# Patient Record
Sex: MALE | Race: WHITE | Employment: STUDENT | URBAN - NONMETROPOLITAN AREA
[De-identification: names, ages, dates, MRNs, and addresses within clinical notes are randomized per-mention and may not be internally consistent; named-entity substitution may affect disease eponyms.]

---

## 2022-05-01 ENCOUNTER — HOSPITAL ENCOUNTER (INPATIENT)
Age: 22
LOS: 3 days | Discharge: HOME | DRG: 885 | End: 2022-05-04
Attending: PSYCHIATRY & NEUROLOGY | Admitting: PSYCHIATRY & NEUROLOGY
Payer: COMMERCIAL

## 2022-05-01 DIAGNOSIS — R45.89 SUICIDAL BEHAVIOR WITHOUT ATTEMPTED SELF-INJURY: ICD-10-CM

## 2022-05-01 DIAGNOSIS — F32.A DEPRESSION WITH SUICIDAL IDEATION: Primary | ICD-10-CM

## 2022-05-01 DIAGNOSIS — R45.851 DEPRESSION WITH SUICIDAL IDEATION: Primary | ICD-10-CM

## 2022-05-01 DIAGNOSIS — F10.920 ACUTE ALCOHOLIC INTOXICATION WITHOUT COMPLICATION (HCC): ICD-10-CM

## 2022-05-01 PROBLEM — F33.9 MDD (MAJOR DEPRESSIVE DISORDER), RECURRENT EPISODE (HCC): Status: ACTIVE | Noted: 2022-05-01

## 2022-05-01 LAB
ACETAMINOPHEN LEVEL: < 5 UG/ML (ref 0–20)
ALBUMIN SERPL-MCNC: 5.7 G/DL (ref 3.5–5.1)
ALP BLD-CCNC: 125 U/L (ref 38–126)
ALT SERPL-CCNC: 28 U/L (ref 11–66)
AMPHETAMINE+METHAMPHETAMINE URINE SCREEN: NEGATIVE
ANION GAP SERPL CALCULATED.3IONS-SCNC: 13 MEQ/L (ref 8–16)
AST SERPL-CCNC: 32 U/L (ref 5–40)
BACTERIA: ABNORMAL /HPF
BARBITURATE QUANTITATIVE URINE: NEGATIVE
BASOPHILS # BLD: 0.8 %
BASOPHILS ABSOLUTE: 0.1 THOU/MM3 (ref 0–0.1)
BENZODIAZEPINE QUANTITATIVE URINE: NEGATIVE
BILIRUB SERPL-MCNC: 0.2 MG/DL (ref 0.3–1.2)
BILIRUBIN DIRECT: < 0.2 MG/DL (ref 0–0.3)
BILIRUBIN URINE: NEGATIVE
BLOOD, URINE: NEGATIVE
BUN BLDV-MCNC: 14 MG/DL (ref 7–22)
CALCIUM SERPL-MCNC: 9.7 MG/DL (ref 8.5–10.5)
CANNABINOID QUANTITATIVE URINE: NEGATIVE
CASTS 2: ABNORMAL /LPF
CASTS UA: ABNORMAL /LPF
CHARACTER, URINE: CLEAR
CHLORIDE BLD-SCNC: 105 MEQ/L (ref 98–111)
CO2: 23 MEQ/L (ref 23–33)
COCAINE METABOLITE QUANTITATIVE URINE: NEGATIVE
COLOR: YELLOW
CREAT SERPL-MCNC: 0.9 MG/DL (ref 0.4–1.2)
CRYSTALS, UA: ABNORMAL
EOSINOPHIL # BLD: 1.7 %
EOSINOPHILS ABSOLUTE: 0.2 THOU/MM3 (ref 0–0.4)
EPITHELIAL CELLS, UA: ABNORMAL /HPF
ERYTHROCYTE [DISTWIDTH] IN BLOOD BY AUTOMATED COUNT: 12.2 % (ref 11.5–14.5)
ERYTHROCYTE [DISTWIDTH] IN BLOOD BY AUTOMATED COUNT: 39.7 FL (ref 35–45)
ETHYL ALCOHOL, SERUM: 0.05 %
ETHYL ALCOHOL, SERUM: 0.23 %
GFR SERPL CREATININE-BSD FRML MDRD: > 90 ML/MIN/1.73M2
GLUCOSE BLD-MCNC: 120 MG/DL (ref 70–108)
GLUCOSE URINE: NEGATIVE MG/DL
HCT VFR BLD CALC: 47.5 % (ref 42–52)
HEMOGLOBIN: 16.1 GM/DL (ref 14–18)
IMMATURE GRANS (ABS): 0.04 THOU/MM3 (ref 0–0.07)
IMMATURE GRANULOCYTES: 0.4 %
KETONES, URINE: NEGATIVE
LEUKOCYTE ESTERASE, URINE: NEGATIVE
LYMPHOCYTES # BLD: 12 %
LYMPHOCYTES ABSOLUTE: 1.1 THOU/MM3 (ref 1–4.8)
MCH RBC QN AUTO: 30 PG (ref 26–33)
MCHC RBC AUTO-ENTMCNC: 33.9 GM/DL (ref 32.2–35.5)
MCV RBC AUTO: 88.5 FL (ref 80–94)
MISCELLANEOUS 2: ABNORMAL
MONOCYTES # BLD: 8.6 %
MONOCYTES ABSOLUTE: 0.8 THOU/MM3 (ref 0.4–1.3)
NITRITE, URINE: NEGATIVE
NUCLEATED RED BLOOD CELLS: 0 /100 WBC
OPIATES, URINE: NEGATIVE
OSMOLALITY CALCULATION: 282.9 MOSMOL/KG (ref 275–300)
OXYCODONE: NEGATIVE
PH UA: 5.5 (ref 5–9)
PHENCYCLIDINE QUANTITATIVE URINE: NEGATIVE
PLATELET # BLD: 272 THOU/MM3 (ref 130–400)
PMV BLD AUTO: 9.9 FL (ref 9.4–12.4)
POTASSIUM SERPL-SCNC: 3.9 MEQ/L (ref 3.5–5.2)
PROTEIN UA: ABNORMAL
RBC # BLD: 5.37 MILL/MM3 (ref 4.7–6.1)
RBC URINE: ABNORMAL /HPF
RENAL EPITHELIAL, UA: ABNORMAL
SALICYLATE, SERUM: < 0.3 MG/DL (ref 2–10)
SEG NEUTROPHILS: 76.5 %
SEGMENTED NEUTROPHILS ABSOLUTE COUNT: 7 THOU/MM3 (ref 1.8–7.7)
SODIUM BLD-SCNC: 141 MEQ/L (ref 135–145)
SPECIFIC GRAVITY, URINE: 1.02 (ref 1–1.03)
TOTAL PROTEIN: 8.9 G/DL (ref 6.1–8)
TSH SERPL DL<=0.05 MIU/L-ACNC: 3 UIU/ML (ref 0.4–4.2)
UROBILINOGEN, URINE: 0.2 EU/DL (ref 0–1)
WBC # BLD: 9.1 THOU/MM3 (ref 4.8–10.8)
WBC UA: ABNORMAL /HPF
YEAST: ABNORMAL

## 2022-05-01 PROCEDURE — 80307 DRUG TEST PRSMV CHEM ANLYZR: CPT

## 2022-05-01 PROCEDURE — 82077 ASSAY SPEC XCP UR&BREATH IA: CPT

## 2022-05-01 PROCEDURE — 80053 COMPREHEN METABOLIC PANEL: CPT

## 2022-05-01 PROCEDURE — 80179 DRUG ASSAY SALICYLATE: CPT

## 2022-05-01 PROCEDURE — 82248 BILIRUBIN DIRECT: CPT

## 2022-05-01 PROCEDURE — 36415 COLL VENOUS BLD VENIPUNCTURE: CPT

## 2022-05-01 PROCEDURE — 99285 EMERGENCY DEPT VISIT HI MDM: CPT

## 2022-05-01 PROCEDURE — 1240000000 HC EMOTIONAL WELLNESS R&B

## 2022-05-01 PROCEDURE — 85025 COMPLETE CBC W/AUTO DIFF WBC: CPT

## 2022-05-01 PROCEDURE — 81001 URINALYSIS AUTO W/SCOPE: CPT

## 2022-05-01 PROCEDURE — 80143 DRUG ASSAY ACETAMINOPHEN: CPT

## 2022-05-01 PROCEDURE — 84443 ASSAY THYROID STIM HORMONE: CPT

## 2022-05-01 RX ORDER — HYDROXYZINE HYDROCHLORIDE 25 MG/1
50 TABLET, FILM COATED ORAL 3 TIMES DAILY PRN
Status: DISCONTINUED | OUTPATIENT
Start: 2022-05-01 | End: 2022-05-04 | Stop reason: HOSPADM

## 2022-05-01 RX ORDER — IBUPROFEN 200 MG
400 TABLET ORAL EVERY 6 HOURS PRN
Status: DISCONTINUED | OUTPATIENT
Start: 2022-05-01 | End: 2022-05-04 | Stop reason: HOSPADM

## 2022-05-01 RX ORDER — NICOTINE 21 MG/24HR
1 PATCH, TRANSDERMAL 24 HOURS TRANSDERMAL DAILY
Status: DISCONTINUED | OUTPATIENT
Start: 2022-05-01 | End: 2022-05-04 | Stop reason: HOSPADM

## 2022-05-01 RX ORDER — TRAZODONE HYDROCHLORIDE 50 MG/1
50 TABLET ORAL NIGHTLY PRN
Status: DISCONTINUED | OUTPATIENT
Start: 2022-05-01 | End: 2022-05-04 | Stop reason: HOSPADM

## 2022-05-01 RX ORDER — ACETAMINOPHEN 325 MG/1
650 TABLET ORAL EVERY 4 HOURS PRN
Status: DISCONTINUED | OUTPATIENT
Start: 2022-05-01 | End: 2022-05-04 | Stop reason: HOSPADM

## 2022-05-01 RX ORDER — MAGNESIUM HYDROXIDE/ALUMINUM HYDROXICE/SIMETHICONE 120; 1200; 1200 MG/30ML; MG/30ML; MG/30ML
30 SUSPENSION ORAL EVERY 6 HOURS PRN
Status: DISCONTINUED | OUTPATIENT
Start: 2022-05-01 | End: 2022-05-04 | Stop reason: HOSPADM

## 2022-05-01 ASSESSMENT — ENCOUNTER SYMPTOMS
SINUS PRESSURE: 0
EYE PAIN: 0
SHORTNESS OF BREATH: 0
DIARRHEA: 0
COUGH: 0
BLOOD IN STOOL: 0
CONSTIPATION: 0
RHINORRHEA: 0
NAUSEA: 0
ABDOMINAL PAIN: 0
VOMITING: 0
WHEEZING: 0

## 2022-05-01 ASSESSMENT — SLEEP AND FATIGUE QUESTIONNAIRES
DO YOU USE A SLEEP AID: NO
AVERAGE NUMBER OF SLEEP HOURS: -1
DO YOU HAVE DIFFICULTY SLEEPING: NO
DO YOU HAVE DIFFICULTY SLEEPING: NO

## 2022-05-01 ASSESSMENT — LIFESTYLE VARIABLES
HOW OFTEN DO YOU HAVE A DRINK CONTAINING ALCOHOL: MONTHLY OR LESS
HOW MANY STANDARD DRINKS CONTAINING ALCOHOL DO YOU HAVE ON A TYPICAL DAY: 7 TO 9
HOW OFTEN DO YOU HAVE A DRINK CONTAINING ALCOHOL: MONTHLY OR LESS
HOW MANY STANDARD DRINKS CONTAINING ALCOHOL DO YOU HAVE ON A TYPICAL DAY: 7 TO 9

## 2022-05-01 ASSESSMENT — PATIENT HEALTH QUESTIONNAIRE - PHQ9: SUM OF ALL RESPONSES TO PHQ QUESTIONS 1-9: 13

## 2022-05-01 ASSESSMENT — PAIN - FUNCTIONAL ASSESSMENT
PAIN_FUNCTIONAL_ASSESSMENT: NONE - DENIES PAIN

## 2022-05-01 NOTE — PROGRESS NOTES
Behavioral Health   Admission Note     Admission Type:   Admission Type: Involuntary    Reason for admission:  Reason for Admission: Patient was intoxicated an became suicidal    PATIENT STRENGTHS:       Patient Strengths and Limitations:       Addictive Behavior:   Addictive Behavior  In the Past 3 Months, Have You Felt or Has Someone Told You That You Have a Problem With  : Sex/pornography    Medical Problems:   History reviewed. No pertinent past medical history. Status EXAM:  Mental Status and Behavioral Exam  Normal: No  Level of Assistance: Independent/Self  Facial Expression: Avoids Gaze,Flat,Sad  Affect: Blunt,Constricted  Level of Consciousness: Alert  Frequency of Checks: 4 times per hour, close  Mood:Normal: No  Mood: Depressed,Anxious,Worthless, low self-esteem,Sad  Motor Activity:Normal: Yes  Motor Activity:  (normal)  Eye Contact: Good  Observed Behavior: Cooperative,Friendly  Sexual Misconduct History: Current - no  Preception: [place] to person,[place] to time,[place] to place,[place] to situation  Attention:Normal: Yes  Thought Processes: Circumstantial  Thought Content:Normal: Yes  Depression Symptoms: Feelings of hopelessess,Feelings of worthlessness,Isolative,Loss of interest,Feelings of helplessness,Crying  Anxiety Symptoms: Generalized,Obsessions  Jenny Symptoms: No problems reported or observed. Hallucinations: None  Delusions: No  Memory:Normal: Yes  Memory:  (denies)  Insight and Judgment: No  Insight and Judgment: Poor judgment,Poor insight    Pt admitted with followings belongings:  Dental Appliances: None  Vision - Corrective Lenses: Eyeglasses  Hearing Aid: None  Jewelry: None  Body Piercings Removed: No  Clothing: Pants,Shirt,Socks,Undergarments (belt)  Other Valuables: Wallet,Keys,Lighter/Matches     Admission order obtained Yes  Belongings sent to safe locked locker with. Valuables placed in safe in security envelope, number. Patient's has no home medications.   Patient oriented to surroundings and program expectations and copy of patient rights given. Received admission packet:  Yes  Consents reviewed, signed Yes. \"An Important Message from [employer] About Your Rights\" form reviewed, signed: yes . Patient verbalize understanding: Yes. Patient education on precautions: YES: yes          Patient screened positive for suicide risk on CSSR-S (\"yes\" to question #4, 5, OR 6)  yes. Physician notified of risk score. Orders received N/A .  2 person skin assessment completed upon admission refused. Explained patients right to have family, representative or physician notified of their admission. Patient has Declined for physician to be notified. Patient has Declined for family/representative to be notified. Provided pt with [employer] handout entitled \"Quitting Smoking. \"  Reviewed handout with pt addressing dangers of smoking, developing coping skills, and providing basic information about quitting. Pt response to counseling:  declined    Admission summary: Patient got admitted 5-1-2022 at 65. 25year old male who was drunk and has thought of suicide. Patient was [facility] an has been educated.            Anne Robertson LPN
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
## 2022-05-01 NOTE — PROGRESS NOTES
Checked in on patient. Patient sitting on floor in safe room common area, appears to be sleeping. [place] police continues to monitor.

## 2022-05-01 NOTE — ED NOTES
Patient resting in safe room 26 for 1:1 observation. No signs of distress at this time. Will continue to monitor.       Tanesha Oviedo RN  05/01/22 7034

## 2022-05-01 NOTE — PROGRESS NOTES
This RN has reviewed and agrees with Audi Pastrana LPN's data collection and has collaborated with this LPN regarding the patient's care plan.

## 2022-05-01 NOTE — ED NOTES
ED nurse-to-nurse bedside report    Chief Complaint   Patient presents with    Suicidal      LOC: alert and orientated to name, place, date  Vital signs   Vitals:    05/01/22 0551   BP: 126/63   Pulse: 108   Resp: 20   Temp: 98.6 °F (37 °C)   TempSrc: Oral   SpO2: 100%   Weight: 180 lb (81.6 kg)   Height: 6' 3\" (1.905 m)      Pain:    Pain Interventions: None  Pain Goal: No pain stated   Oxygen: No    Current needs required Constant observation    Telemetry: No  LDAs:    Continuous Infusions:   Mobility: Independent  Veneta Fall Risk Score: No flowsheet data found.   Fall Interventions: Side rails up, hourly rounding   Report given to: Andrew Ingram, [place]  05/01/22 4559

## 2022-05-01 NOTE — ED NOTES
Upon first contact with patient this RN receives bedside shift report Alyx Bae RN. Patient in safe room 26 for 1:1 observation. No signs of distress. Will continue to monitor.       Juanita Oviedo RN  05/01/22 9654

## 2022-05-01 NOTE — ED NOTES
Patient resting in safe room 26 for 1:1 observation. No signs of distress at this time. Will continue to monitor.       Darryl Oviedo RN  05/01/22 8020

## 2022-05-01 NOTE — ED NOTES
Pt resting in bed with constant observation in progress, no distress noted as pt breaths easy and unlabored.        [facility]  05/01/22 9046

## 2022-05-01 NOTE — ED NOTES
ED to inpatient nurses report    Chief Complaint   Patient presents with    Suicidal      Present to ED from home  LOC: alert and orientated to name, place, date  Vital signs   Vitals:    05/01/22 0551 05/01/22 1045 05/01/22 1526   BP: 126/63     Pulse: 108 102 90   Resp: 20 18 17   Temp: 98.6 °F (37 °C)     TempSrc: Oral     SpO2: 100% 100% 100%   Weight: 180 lb (81.6 kg)     Height: 6' 3\" (1.905 m)        Oxygen Baseline room air WNL    Current needs required room air WNL Bipap/Cpap No  LDAs:    Mobility: Independent  Pending ED orders: none at this time  Present condition: patient resting in safe room 26      Electronically signed by Neena Webb RN on 5/1/2022 at 4:20 PM     Filomena Oviedo RN  05/01/22 6548

## 2022-05-01 NOTE — PROGRESS NOTES
Patient continues resting on safe room common area floor. Patient denies any needs. Updated on plan for BAL redraw at 1400.

## 2022-05-01 NOTE — ED NOTES
Patient resting in safe room 26 for 1:1 observation. No signs of distress at this time. Will continue to monitor.         Tanesha Oviedo RN  05/01/22 7927
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
## 2022-05-01 NOTE — ED NOTES
Patient resting in safe room 26 for 1:1 observation. No signs of distress at this time. Will continue to monitor.              Pancho Oviedo RN  05/01/22 8643

## 2022-05-01 NOTE — PROGRESS NOTES
BAL 24 Hour Re-Assess:   Status & Exam & Behavior Support Service Tabs      Present Suicidal Behavior:      Verbal: Denies    Plan: Denies    Current Suicide Risk: Low, Moderate or High: Low      Present Homicidal Behavior:    Verbal: Denies    Plan: Denies      Psychosis:    Hallucinations: Denies    Delusions: Denies      Clinical Re-Assessment Summary including Current Mental State of Patient:     Patient is a 25year old male who presents to the ED on [facility] via [place] [employer]. Patient BAL was . 23 upon arrival. Patient currently denies any suicidal/homicidal ideation. Patient reports when he presented to the ED it was due to suicidal thoughts which he had made statements about. Patient denies any significant stressors recently. Patient denies any history of suicidal thoughts or attempts, however this is contradicting of earlier reports to third shift SW and medical provider. Hallucinations denied. No delusions noted. Updated Level of Care Disposition:     Consulted with Dr. Gina Ny. Patient to be admitted to 4E. Nurse updated on POC. Patient updated on POC. Report given to Patti Barr on 4E.
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
## 2022-05-02 PROBLEM — F33.2 SEVERE EPISODE OF RECURRENT MAJOR DEPRESSIVE DISORDER, WITHOUT PSYCHOTIC FEATURES (HCC): Status: ACTIVE | Noted: 2022-05-01

## 2022-05-02 PROBLEM — F32.A DEPRESSION WITH SUICIDAL IDEATION: Status: ACTIVE | Noted: 2022-05-01

## 2022-05-02 PROBLEM — R45.851 DEPRESSION WITH SUICIDAL IDEATION: Status: ACTIVE | Noted: 2022-05-01

## 2022-05-02 PROBLEM — F32.2 MAJOR DEPRESSIVE DISORDER, SINGLE EPISODE, SEVERE WITHOUT PSYCHOTIC FEATURES (HCC): Status: ACTIVE | Noted: 2022-05-01

## 2022-05-02 PROCEDURE — 90792 PSYCH DIAG EVAL W/MED SRVCS: CPT | Performed by: PSYCHIATRY & NEUROLOGY

## 2022-05-02 PROCEDURE — APPSS30 APP SPLIT SHARED TIME 16-30 MINUTES: Performed by: PHYSICIAN ASSISTANT

## 2022-05-02 PROCEDURE — 6370000000 HC RX 637 (ALT 250 FOR IP): Performed by: PHYSICIAN ASSISTANT

## 2022-05-02 PROCEDURE — 6370000000 HC RX 637 (ALT 250 FOR IP): Performed by: PSYCHIATRY & NEUROLOGY

## 2022-05-02 PROCEDURE — 1240000000 HC EMOTIONAL WELLNESS R&B

## 2022-05-02 RX ORDER — SERTRALINE HYDROCHLORIDE 25 MG/1
25 TABLET, FILM COATED ORAL DAILY
Status: DISCONTINUED | OUTPATIENT
Start: 2022-05-02 | End: 2022-05-03

## 2022-05-02 RX ADMIN — IBUPROFEN 400 MG: 200 TABLET, FILM COATED ORAL at 14:47

## 2022-05-02 RX ADMIN — SERTRALINE 25 MG: 25 TABLET, FILM COATED ORAL at 14:47

## 2022-05-02 ASSESSMENT — PAIN SCALES - GENERAL: PAINLEVEL_OUTOF10: 5

## 2022-05-02 ASSESSMENT — PAIN DESCRIPTION - LOCATION: LOCATION: HEAD

## 2022-05-02 NOTE — GROUP NOTE
Group Therapy Note    Date: 5/2/2022    Group Start Time: 1100  Group End Time: 36  Group Topic: Healthy Living/Wellness    [facility] 4E    Noé Blackman LPN        Group Therapy Note    Attendees: 2        Notes:  attended    Status After Intervention:  Improved    Participation Level:  Active Listener and Interactive    Participation Quality: Appropriate, Attentive and Sharing      Speech:  normal      Thought Process/Content: Logical      Affective Functioning: Flat      Level of consciousness:  Alert, Oriented x4 and Attentive      Response to Learning: Able to verbalize current knowledge/experience, Able to verbalize/acknowledge new learning and Able to retain information      Endings: None Reported    Modes of Intervention: Education, Support and Socialization      Discipline Responsible: Licensed Practical Nurse, students      Signature:  Noé Blackman LPN

## 2022-05-02 NOTE — PROGRESS NOTES
Behavioral Services  Medicare Certification Upon Admission    I certify that this patient's inpatient psychiatric hospital admission is medically necessary for:    [x] (1) Treatment which could reasonably be expected to improve this patient's condition,       [x] (2) Or for diagnostic study;     AND     [x](2) The inpatient psychiatric services are provided while the individual is under the care of a physician and are included in the individualized plan of care.     Estimated length of stay/service 3-5 days    Plan for post-hospital care hc    Electronically signed by Chyna Jones MD on 5/2/2022 at 9:40 AM

## 2022-05-02 NOTE — PROGRESS NOTES
[facility]  Initial Interdisciplinary Treatment Plan NOTE    Review Date & Time: 05/02/22     Patient was in treatment team.  See Multidisciplinary Treatment Team sheet for participants. Admission Type:   Admission Type: Involuntary    Reason for admission:  Reason for Admission: Patient was intoxicated an became suicidal      Estimated Length of Stay Update:  05/03/22  Estimated Discharge Date Update: 3-5 days    PATIENT STRENGTHS:  Patient Strengths    Patient Strengths and Limitations:   Addictive Behavior:Addictive Behavior  In the Past 3 Months, Have You Felt or Has Someone Told You That You Have a Problem With  : Sex/pornography  Medical Problems:History reviewed. No pertinent past medical history. EDUCATION:   Learner Progress Toward Treatment Goals: Reviewed results and recommendations of this team, Reviewed group plan and strategies, Reviewed signs, symptoms and risk of self harm and violent behavior and Reviewed goals and plan of care    Method: Individual    Outcome: Verbalized understanding and Demonstrated Understanding    PATIENT GOALS: See below    PLAN/TREATMENT RECOMMENDATIONS UPDATE:   1. What is the most important thing we can help you with while you are here? Adjust my expectations of myself. 2. Who is your support system? Family, friends  3. Do you have follow-up providers? None. Will be referred to [facility]  4. Do you have the ability to pay for your medications? No  5. Where will you be residing when you leave the hospital? With parents  6. Will need a return to work slip or FMLA paper completion?  Yes      GOALS UPDATE:   Time frame for Short-Term Goals: Daily    NATALIIA Farr

## 2022-05-02 NOTE — H&P
Department of Psychiatry  Psychiatric Assessment   Reason for Admission to Psychiatric Unit:  Threat to self requiring 24 hour professional observation  Concerns about patient's safety in the community    CHIEF COMPLAINT:  Suicidal ideation     HISTORY OF PRESENT ILLNESS:      Alondra Ward is a 25 y.o. male with a history of depression  who presented to the emergency department on [address] by Marlys TOLBERT for suicidal ideation. Per [address], \"Sven made multiple threats to kill himself and tried fighting officers. He also made threats for me to \"kill\" him.      Per the [facility] AN AFFILIATE OF [facility] note: \"Pt states he \"wants to end it all and be done. \" He started having these thoughts tonight but cannot remember why. \"Sushila had a lot to drink. \" He reports feeling like \"the sum of the Earth and there's no point in being around like that. \" Pt is unaware who called the police and does not recall which friend he made suicidal statements to. Pt states he attempted suicide in May 2019 \"I loaded a shell in the shogun and just looked at it. I should have done it. \" Reports recent stressors of college and family. \"      BAL was 0.23 upon arrival to the ED     Tyrus Schwab reports he was drinking with his friends yesterday. He states he was telling his friends that he was suicidal and then his friends called the . He confirms that he did ask the  to kill him. He reports he has been having suicidal thoughts coming and going for the last few years. He says lately, the thoughts been getting progressively worse. He denies having a specific plan or intent prior to yesterday. He reports he has been dealing with depression for a few years since his grandfather  in 2019. He says he then later dropped out of college. He has never gotten formal help for his depression. He states he always thought he could deal with it on his own. He mentions that he feels like a burden on his family and at work.   He says at this time, he does not have enough money saved up to live on his own so he has to live with his parents. He feels like a burden at work because every mistake he makes he feels a whole weight come down on him. He endorses having significant issues with self-esteem since a young age. He mentions that he puts everyone before himself. He denies feeling down and sad for more days than not. He reports his sleep has been fine at home. Appetite has been good. Energy and motivation have been good. He enjoys working every day. Denies anhedonia. He reports he enjoys playing the [employer] and video games. He has been feeling worthless, hopeless and helpless. Jordon Ramey continues to feel depressed today. He reports he is ashamed about the events that led to his admission and that it had to happen that way. He endorses fleeting suicidal thoughts but denies any specific plan or intent. He denies any active suicidal thoughts during the interview and is able to contract for safety in the unit. He denies any hallucinations. No evidence of delusions or overt psychosis on examination. PSYCHIATRIC HISTORY:      · Outpatient psychiatric provider:  No one currently. Has never seen a therapist or psychiatrist in the past  · Suicide attempts: Patient denies any; per the medical record, the patient had an aborted suicide attempt by shooting himself in May 2019  · Inpatient psychiatric admissions: Denies any  · Home Medication Compliance: Not prescribed psychiatric medication    Past psychiatric medications includes:     None  Adverse reactions from psychotropic medications:    No      Past Medical History:    History reviewed. No pertinent past medical history. Past Surgical History:    History reviewed. No pertinent surgical history. Medications Prior to Admission:   No medications prior to admission. Allergies:  Patient has no known allergies.     Social History:   BORN & RAISED IN [facility]  · RESIDENCE: Currently lives in [place] with his parents and younger brother  · LEVEL OF EDUCATION:   Completed 2 years of college studying chemical engineering at the [address] [place] [address] Rd. Did not obtain his degree  · MARITAL STATUS: Single  · CHILDREN: None  · OCCUPATION: Works as a hog stein at [employer]  · PATIENT ASSETS: Seeking help, family supportive, employment    DRUG USE HISTORY  Social History     Tobacco Use   Smoking Status Current Some Day Smoker    Types: Cigars   Smokeless Tobacco Never Used   Tobacco Comment    once a weekend     Social History     Substance and Sexual Activity   Alcohol Use Yes    Comment:  Tonnie Kavya half bottle once a month     Social History     Substance and Sexual Activity   Drug Use Never     Patient reports he drinks half of a bottle of Keshawn once a month. Denies any history of withdrawal from alcohol. Denies any illicit drug use    LEGAL HISTORY:   HISTORY OF INCARCERATION: no    Family Psychiatric and Medical History:   Denies any family psychiatric history  Denies suicides in family  Denies substance use in family        Problem Relation Age of Onset    Hypertension Mother     Heart Disease Paternal Grandfather     Heart Disease Other     Hypertension Paternal Grandmother     Hypertension Other     Diabetes Other     Cancer Other         ?  Kidney Disease Other          Lifetime Psychiatric Review of Systems      ·    Obsessions and Compulsions: denies  ·    Jenny or Hypomania: denies  ·    Hallucinations: denies  ·    Panic Attacks:  denies   ·    Delusions:  denies  ·    Phobias: denies       Medical Review of Systems:     Constitutional: Negative for appetite change, diaphoresis, fatigue and fever. HENT: Negative for congestion, sore throat and tinnitus. Eyes: Negative for visual disturbance. Respiratory: Negative for cough, shortness of breath and wheezing. Cardiovascular: Negative for chest pain and leg swelling. Gastrointestinal: Negative for nausea, vomiting, diarrhea.  Negative for abdominal pain. Genitourinary: Negative for frequency. Musculoskeletal: Negative for arthralgias, myalgias and neck stiffness. Skin: Negative for puritis. Neurological: Negative for dizziness, weakness and headaches. All other systems reviewed and are negative. PHYSICAL EXAM:  Vitals:  BP (!) 106/41   Pulse 98   Temp 98 °F (36.7 °C) (Oral)   Resp 16   Ht 6' 3\" (1.905 m)   Wt 180 lb (81.6 kg)   SpO2 100%   BMI 22.50 kg/m²     Pain Level: Denies any pain      Physical Exam:    Constitutional: Well developed, well nourished, no acute distress  Eyes: Pupils round and reactive to light bilaterally  Neck:  Supple, no thyromegaly. Cardiovascular:  Normal rate and rhythm, normal S1 and S2. No murmur or gallop on auscultation. Radial pulses 2+ and brisk bilaterally  Lungs: Clear to auscultation bilaterally without wheezing or rales. Musculoskeletal:  Full range of motion in all four extremities. Neurologic:  Cranial nerves II through XII are grossly intact. Normal gait and station.        Mental Status Examination:    Level of consciousness:  alert and awake  Appearance:  well-appearing, hospital attire, in chair, good grooming and good hygiene  Behavior/Motor:  no abnormalities noted  Attitude toward examiner:  cooperative, attentive and good eye contact  Speech:  spontaneous, normal rate and normal volume  Mood: Depressed   Affect:  blunted  Thought processes:  linear, goal directed and coherent  Thought content:  Denies homicidal ideation  Suicidal Ideation: Fleeting without current plan or intent  Delusions:  no evidence of delusions  Perceptual Disturbance:  denies any perceptual disturbance  Cognition: Patient is oriented to person, place, time and situation  Concentration: clinically adequate  Memory: intact  Insight & Judgement: fair         DSM-5 DIAGNOSIS:    Depression with suicidal ideation  Alcohol use disorder, mild   Rule out substance induced mood disorder     Patient Active Problem List   Diagnosis    Depression with suicidal ideation          Psychosocial and Contextual Factors:   Financial  Occupational  Living situation  Educational  Loss of grandfather in 2019       History reviewed. No pertinent past medical history. Goals:    Reviewed labs  Reviewed EKG  Will obtain records and review them today. Medication adjustment as discussed with the attending physician: Will add Zoloft 25mg daily   Consults: none   Encouraged patient to engage in groups, milieu, and individual therapies offered as part of programing. Behavioral Services  Medicare Certification Upon Admission    I certify that this patient's inpatient psychiatric hospital admission is medically necessary for:   X (1) Treatment which could reasonably be expected to improve this patient's condition,      X (2) Or for diagnostic study;     AND     X (2) The inpatient psychiatric services are provided while the individual is under the care of a physician and are included in the individualized plan of care. Estimated length of stay/service: Greater than two midnights will be required to reach therapeutic levels of medications and to stabilize mood    Plan for post-hospital care: Follow up with outpatient psychiatric services    Electronically signed by Ricardo Michelle PA-C on 5/2/2022 at 2:00 PM      **This report has been created using voice recognition software. It may contain minor errors which are inherent in voice recognition technology. **                                   Psychiatry Attending Attestation     I assessed this patient and reviewed the case and plan of care with Ricardo Michelle PA-C. I have reviewed the above documentation and I agree with the findings and treatment plan with the following updates. Patient was evaluated by Ricardo Michelle PA-C on the unit in person and I evaluated patient as Tele visit.   Patient is a 81-year-old male with history of depression admitted for worsening suicidal thoughts and after he made statements to the police to kill him. Per report patient was drinking around half a bottle of liquor following which she was making several suicidal statements. His parents had to call police to calm him down and patient made several suicidal threats to the police and wanted to get killed by them. Reports that he has been feeling increasingly depressed for last several months now. Endorses anhedonia. Reports that he has been carrying a lot of guilt as being a burden to his family and friends at work. Patient expressed several self-esteem issues during the conversation. He is willing to get help here on the unit. Currently denies any withdrawal symptoms from alcohol. Reports that he only binge drinks half a bottle of liquor once every month. Discussed with him about starting Zoloft to help with his mood and he is agreeable to the plan. TREATMENT PLAN    Risk Management:  close watch per standard protocol      Psychotherapy:  participation in milieu and group and individual sessions with Attending Physician,  and Physician Assistant/CNP      Estimated length of stay: It might take more than 2 midnights to stabilize patient's mood/thoughts and titrate medications to effect. GENERAL PATIENT/FAMILY EDUCATION  Patient will understand basic signs and symptoms, Patient will understand benefits/risks and potential side effects from proposed meds and Patient will understand their role in recovery. Family is  active in patient's care. Patient assets that may be helpful during treatment include: Intent to participate and engage in treatment, sufficient fund of knowledge and intellect to understand and utilize treatments. Risk level: High     Goals:    Reviewed labs  Will obtain records/collateral information and review them today.   Medication adjustment: Will start Zoloft and titrate to effect  We will observe for any alcohol withdrawal symptoms  Consults: None    Gordo Brantley is a 25 y.o. male being evaluated by a Virtual Visit (video visit) encounter to address concerns as mentioned above. A caregiver was present in the room along with the patient. Patient is present at [address] [place] [address] [address] and I am physically present at my home in hospitals     This Virtual Visit was conducted with patient's consent. The patient is located in a state where I am licensed to provide care. --Sebastián Downing MD on 5/2/2022 at 2:40 PM    An electronic signature was used to authenticate this note. **This report has been created using voice recognition software. It may contain minor errors which are inherent in voice recognition technology. **

## 2022-05-02 NOTE — PLAN OF CARE
Problem: Self Harm/Suicidality  Goal: Will have no self-injury during hospital stay  Description: INTERVENTIONS:  1. Q 30 MINUTES: Routine safety checks  2. Q SHIFT & PRN: Assess risk to determine if routine checks are adequate to maintain patient safety  5/2/2022 1242 by Vonn Phoenix, RN  Outcome: Progressing  5/1/2022 2354 by Heather Lynch RN  Outcome: Progressing  Note: Patient remained safe and free of harm     Problem: Depression  Goal: Will be euthymic at discharge  Description: INTERVENTIONS:  1. Administer medication as ordered  2. Provide emotional support via 1:1 interaction with staff  3. Encourage involvement in milieu/groups/activities  4. Monitor for social isolation  5/2/2022 1242 by Vonn Phoenix, RN  Outcome: Progressing  5/1/2022 2354 by Heather Lynch RN  Outcome: Progressing  Note: Patient flat, withdrawn and isolative to room     Problem: Behavior  Goal: Pt/Family maintain appropriate behavior and adhere to behavioral management agreement, if implemented  Description: INTERVENTIONS:  1. Assess patient/family's coping skills and  non-compliant behavior (including use of illegal substances)  2. Notify security of behavior or suspected illegal substances which indicate the need for search of the patient and/or belongings  3. Encourage verbalization of thoughts and concerns in a socially appropriate manner  4. Utilize positive, consistent limit setting strategies supporting safety of patient, staff and others  5. Encourage participation in the decision making process about the behavioral management agreement  6. Implement a Health Care Agreement if patient meets criteria  7. If a patient's behavior jeopardizes the safety of the patient, staff, or others refer to organization policy. If a visitor's behavior poses a threat to safety call refer to organization policy.   8. Initiate consult with , Psychosocial CNS, Spiritual Care as appropriate  5/2/2022 1242 by Vonn Phoenix, RN  Outcome: Progressing  Flowsheets (Taken 5/2/2022 0800)  Patient/family maintains appropriate behavior and adheres to behavioral management agreement, if implemented: Assess patient/familys coping skills and  non-compliant behavior (including use of illegal substances)  5/1/2022 2354 by Wolf Gutierrez RN  Outcome: Progressing  Note: Patient new to unit, isolative to room. Cooperative with assessment     Problem: Anxiety  Goal: Will report anxiety at manageable levels  Description: INTERVENTIONS:  1. Administer medication as ordered  2. Teach and rehearse alternative coping skills  3. Provide emotional support with 1:1 interaction with staff  5/2/2022 1242 by Arabella Dias RN  Outcome: Progressing  Flowsheets (Taken 5/2/2022 0800)  Will report anxiety at manageable levels: Provide emotional support with 1:1 interaction with staff  5/1/2022 2354 by Wolf Gutierrez RN  Outcome: Progressing  Note: Patient denies anxiety and depression but states mood is 4/10     Problem: Involuntary Admit  Goal: Will cooperate with staff recommendations and doctor's orders and will demonstrate appropriate behavior  Description: INTERVENTIONS:  1. Treat underlying conditions and offer medication as ordered  2. Educate regarding involuntary admission procedures and rules  3.  Contain excessive/inappropriate behavior per unit and hospital policies  9/3/2948 [id number] by Arabella Dias RN  Outcome: Progressing  Flowsheets (Taken 5/1/2022 1733 by Mahendra Huerta LPN)  Will cooperate with staff recommendations and doctor's orders and will demonstrate appropriate behavior: Treat underlying conditions and offer medication as ordered  5/1/2022 2354 by Wolf Gutierrez RN  Outcome: Progressing  Note: Patient calm and cooperative with care, isolative to room     Problem: Discharge Planning  Goal: Discharge to home or other facility with appropriate resources  5/2/2022 1242 by Arabella Dias RN  Outcome: Not Progressing  Flowsheets (Taken 5/2/2022 0800)  Discharge to home or other facility with appropriate resources:   Identify barriers to discharge with patient and caregiver   Arrange for needed discharge resources and transportation as appropriate  Note: Patient not discharged this shift.  Patient continues to work with care team toward discharge goal.   5/1/2022 2354 by Mellisa Luis RN  Outcome: Progressing  Note: Patient plans to be discharged home

## 2022-05-02 NOTE — BH NOTE
INPATIENT RECREATIONAL THERAPY  ADULT BEHAVIORAL SERVICES  EVALUATION    REFERRING PHYSICIAN:   Dr. Valery Chin  DIAGNOSIS:    Major Depressive Disorder, Recurrent Episode  PRECAUTIONS:   Standard precautions    HISTORY OF PRESENT ILLNESS/INJURY:   Patient was admitted to the unit due to suicidal ideation and depression. Patient stated that he told the police to shoot him prior to admission. Patient reported having thoughts of cutting his wrists with his glasses once he was in the safe room. Patient was intoxicated when he made these statements. Patient stated that he does not have any significant stressors but feels like he has failed at life due to not being able to pass his college classes. Patient was pleasant and cooperative but guarded. PMH:  Please see medical chart for prior medical history, allergies, and medication    HISTORY OF PSYCHIATRIC TREATMENT:  None reported    YOB: 2000  GENDER:   male  MARITAL STATUS:   single  EMPLOYMENT STATUS:   Did not assess    LIVING SITUATION/SUPPORT:   Lives with parents. EDUCATIONAL LEVEL:   Some college  MEDICATION/DRUG USE:  Alcohol abuse. LEISURE INTERESTS:   Family activities, watching TV and movies, coloring, computer activities, arts/crafts, word search puzzles, sudoku puzzles, listening to music, activities with friends  ACTIVITY PREFERENCE:   Small group  ACTIVITY TYPES:    Passive. Indoor. Outdoor. Active. COGNITION:   A&Ox4    COPING:    poor  ATTENTION:  fair  RELAXATION:   Patient reported poor sleep, anxiety and a poor appetite with weight loss.    SELF-ESTEEM:   poor  MOTIVATION:   Poor - poor insight    SOCIAL SKILLS:   Fair - guarded  FRUSTRATION TOLERANCE:   fair  ATTENTION SEEKING:   None noted  COOPERATION:  Cooperative and pleasant but guarded  AFFECT:  flat  APPEARANCE:  fair    HEARING:   No problems noted  VISION:  Corrected with glasses  VERBAL COMMUNICATION:  No problems noted  WRITTEN COMMUNICATION:   No problems noted    COORDINATION:   No problems noted  MOBILITY:  Ambulates independently   GOALS:   Identify 2 new positive coping skills by time of discharge.

## 2022-05-02 NOTE — PROGRESS NOTES
Psychosocial Assessment    Current Level of Psychosocial Functioning     Independent       XXX  Dependent    Minimal Assist     Comments:      Psychosocial High Risk Factors (check all that apply)    Unable to obtain meds   Chronic illness/pain    Substance abuse       XXX  Lack of Family Support   Financial stress   Isolation   Inadequate Community Resources  Suicide attempt(s)  Not taking medications   Victim of crime   Developmental Delay  Unable to manage personal needs    Age 72 or older   Homeless  No transportation   Readmission within 30 days  Unemployment  Traumatic Event  Academic       XXX    Family/Supports identified:     Family and friends    Sexual Orientation:       Heterosexual    Patient Strengths:      Employed, support, seeking help    Patient Barriers:       Unreasonable expectations of self. Safety plan:       Contracts for safety    CMHC/MH history:      None    Plan of Care:  medication management, group/individual therapies, family meetings, psycho -education, treatment team meetings to assist with stabilization    Initial Discharge Plan:  [facility]    Clinical Summary:  Annie Boyle is a 25year old single male, who was transported to the ED under Emergency Medical commitment. Patient was intoxicated, BAL . 23. Pt was making suicidal statements while intoxicated. He admits that he remembers little of the precipitating events. Pt does identify a history of untreated anxiety and depression. He has always had unreasonable high expectations for himself, yet never feels that he achieves enough. Pt was a 4.0 student in [address] but struggled while at the [address] His grandfather passed away his freshman year and he feels he never grieved properly. His anxiety increased with declining academic performance and he eventually failed out. He feels he is burden for his parents although they tell him he is not.  He is employed full time. He has a younger brother who he feels he is responsible to for setting a good example. Pt has never had any treatment. Denies current suicidal ideation.

## 2022-05-02 NOTE — PROGRESS NOTES
Group Therapy Note    Date: 5/2/2022  Start Time: 1330  End Time:  1440  Number of Participants: 7    Type of Group: Psychotherapy      Notes:  Pt was present for group. He is new to the unit. Pt listened closely as the other group members shared their personal experience with mental health/addiction issues. They each shared what they had learned from their current admission as well as past experiences with relapse recovery. Pt shared that although he was initially skeptical , he now feels that this may be helpful for him. He identified his need to not hold himself to unreasonable expectations. Status After Intervention:  Improved    Participation Level:  Active Listener and Interactive    Participation Quality: Appropriate, Attentive, Sharing and Supportive      Speech:  normal      Thought Process/Content: Logical  Linear      Affective Functioning: Congruent      Mood: anxious      Level of consciousness:  Alert, Oriented x4 and Attentive      Response to Learning: Able to verbalize current knowledge/experience, Able to verbalize/acknowledge new learning, Able to retain information, Capable of insight, Able to change behavior and Progressing to goal      Endings: None Reported    Modes of Intervention: Education, Support, Socialization, Exploration, Clarifying, Problem-solving and Activity      Discipline Responsible: /Counselor      Signature:  NATALIIA Harmon

## 2022-05-02 NOTE — PLAN OF CARE
Problem: Self Harm/Suicidality  Goal: Will have no self-injury during hospital stay  Description: INTERVENTIONS:  1. Q 30 MINUTES: Routine safety checks  2. Q SHIFT & PRN: Assess risk to determine if routine checks are adequate to maintain patient safety  Outcome: Progressing  Note: Patient remained safe and free of harm     Problem: Depression  Goal: Will be euthymic at discharge  Description: INTERVENTIONS:  1. Administer medication as ordered  2. Provide emotional support via 1:1 interaction with staff  3. Encourage involvement in milieu/groups/activities  4. Monitor for social isolation  Outcome: Progressing  Note: Patient flat, withdrawn and isolative to room     Problem: Behavior  Goal: Pt/Family maintain appropriate behavior and adhere to behavioral management agreement, if implemented  Description: INTERVENTIONS:  1. Assess patient/family's coping skills and  non-compliant behavior (including use of illegal substances)  2. Notify security of behavior or suspected illegal substances which indicate the need for search of the patient and/or belongings  3. Encourage verbalization of thoughts and concerns in a socially appropriate manner  4. Utilize positive, consistent limit setting strategies supporting safety of patient, staff and others  5. Encourage participation in the decision making process about the behavioral management agreement  6. Implement a Health Care Agreement if patient meets criteria  7. If a patient's behavior jeopardizes the safety of the patient, staff, or others refer to organization policy. If a visitor's behavior poses a threat to safety call refer to organization policy. 8. Initiate consult with , Psychosocial CNS, Spiritual Care as appropriate  Outcome: Progressing  Note: Patient new to unit, isolative to room. Cooperative with assessment     Problem: Anxiety  Goal: Will report anxiety at manageable levels  Description: INTERVENTIONS:  1.  Administer medication as ordered  2. Teach and rehearse alternative coping skills  3. Provide emotional support with 1:1 interaction with staff  Outcome: Progressing  Note: Patient denies anxiety and depression but states mood is 4/10     Problem: Involuntary Admit  Goal: Will cooperate with staff recommendations and doctor's orders and will demonstrate appropriate behavior  Description: INTERVENTIONS:  1. Treat underlying conditions and offer medication as ordered  2. Educate regarding involuntary admission procedures and rules  3. Contain excessive/inappropriate behavior per unit and hospital policies  Outcome: Progressing  Note: Patient calm and cooperative with care, isolative to room     Problem: Discharge Planning  Goal: Discharge to home or other facility with appropriate resources  Outcome: Progressing  Note: Patient plans to be discharged home   Care plan reviewed with patient and verbalize understanding of the plan of care and contribute to goal setting.

## 2022-05-03 PROCEDURE — 1240000000 HC EMOTIONAL WELLNESS R&B

## 2022-05-03 PROCEDURE — 99232 SBSQ HOSP IP/OBS MODERATE 35: CPT | Performed by: PSYCHIATRY & NEUROLOGY

## 2022-05-03 PROCEDURE — 90833 PSYTX W PT W E/M 30 MIN: CPT | Performed by: PSYCHIATRY & NEUROLOGY

## 2022-05-03 PROCEDURE — APPSS30 APP SPLIT SHARED TIME 16-30 MINUTES: Performed by: PHYSICIAN ASSISTANT

## 2022-05-03 PROCEDURE — 6370000000 HC RX 637 (ALT 250 FOR IP): Performed by: PHYSICIAN ASSISTANT

## 2022-05-03 RX ADMIN — SERTRALINE 25 MG: 25 TABLET, FILM COATED ORAL at 08:40

## 2022-05-03 ASSESSMENT — LIFESTYLE VARIABLES
HOW OFTEN DO YOU HAVE A DRINK CONTAINING ALCOHOL: MONTHLY OR LESS
HOW MANY STANDARD DRINKS CONTAINING ALCOHOL DO YOU HAVE ON A TYPICAL DAY: 7 TO 9

## 2022-05-03 ASSESSMENT — PAIN SCALES - GENERAL
PAINLEVEL_OUTOF10: 0
PAINLEVEL_OUTOF10: 0

## 2022-05-03 NOTE — PATIENT CARE CONFERENCE
[facility]  Day 3 Interdisciplinary Treatment Plan NOTE    Review Date & Time: 5/3/2022 at 1000    Patient was in treatment team    Admission Type:   Admission Type: Involuntary    Reason for admission:  Reason for Admission: Patient was intoxicated an became suicidal  Estimated Length of Stay Update:  3-5 days  Estimated Discharge Date Update: 5/6/2022    PATIENT STRENGTHS:  Patient Strengths    Patient Strengths and Limitations:   Addictive Behavior:Addictive Behavior  In the Past 3 Months, Have You Felt or Has Someone Told You That You Have a Problem With  : Sex/pornography  Medical Problems:History reviewed. No pertinent past medical history. Risk:  Fall Risk   Monster Scale Monster Scale Score: 22    Status EXAM:   Mental Status and Behavioral Exam  Normal: No  Level of Assistance: Independent/Self  Facial Expression: Flat,Brightened  Affect: Appropriate  Level of Consciousness: Alert  Frequency of Checks: 4 times per hour, close  Mood:Normal: Yes  Mood: Sad  Motor Activity:Normal: Yes  Motor Activity: Decreased  Eye Contact: Good  Observed Behavior: Cooperative,Friendly  Sexual Misconduct History: Current - no  Preception: [place] to person,[place] to time,[place] to place,[place] to situation  Attention:Normal: Yes  Thought Processes: Circumstantial  Thought Content:Normal: Yes  Depression Symptoms: No problems reported or observed. Anxiety Symptoms: No problems reported or observed. Jenny Symptoms: No problems reported or observed.   Hallucinations: None  Delusions: No  Memory:Normal: Yes  Memory: Poor recent  Insight and Judgment: No  Insight and Judgment: Poor insight    Daily Assessment Last Entry:   Daily Sleep (WDL): Within Defined Limits            Daily Nutrition (WDL): Within Defined Limits  Level of Assistance: Independent/Self    Patient Monitoring:  Frequency of Checks: 4 times per hour, close    Psychiatric Symptoms:   Depression Symptoms  Depression Symptoms: No problems reported or observed. Anxiety Symptoms  Anxiety Symptoms: No problems reported or observed. Jenny Symptoms  Jenny Symptoms: No problems reported or observed. Suicide Risk CSSR-S:  1) Within the past month, have you wished you were dead or wished you could go to sleep and not wake up? : Yes  2) Have you actually had any thoughts of killing yourself? : Yes  3) Have you been thinking about how you might kill yourself? : Yes  5) Have you started to work out or worked out the details of how to kill yourself? Do you intend to carry out this plan? : No  6) Have you ever done anything, started to do anything, or prepared to do anything to end your life?: No    EDUCATION:   Learner Progress Toward Treatment Goals: Reviewed goals and plan of care    Method: Individual    Outcome: Verbalized understanding    PATIENT GOALS: To not be so hard on himself when he does not meet his own expectations. PLAN/TREATMENT RECOMMENDATIONS UPDATE:  1. How are you progressing toward meeting your main treatment goal?  Pt stated that he is feeling better today compared to yesterday. Pt stated that talking with others has been helpful for him. 2.  Are there discharge barriers/lingering problems that need to be addressed? Pt will be referred to [facility] for follow-up. 3.  Do you have the ability to pay for your medications? Currently, pt does not              have insurance, so may need help with obtaining medications upon           discharge. 4.  How is your group participation? Pt attends with peers. GOALS UPDATE:   Time frame for Short-Term Goals: 3-5 days.       Boaz Valverde RN

## 2022-05-03 NOTE — PROGRESS NOTES
Group Therapy Note    Date: 5/3/2022  Start Time: 1330  End Time:  1430  Number of Participants: 10    Type of Group: Psychotherapy      Notes: Pt is present for group. Peers discussed hope and ways in which they may take action in their own recovery process. Peers explored self defeating beliefs which create barriers to change. Peers identified ways in which they modify those beliefs. Introduced peers to [facility] CBT concepts. Status After Intervention:  Improved    Participation Level:  Active Listener and Interactive    Participation Quality: Appropriate, Attentive and Sharing      Speech:  normal      Thought Process/Content: Logical  Linear      Affective Functioning: Congruent      Mood: euthymic      Level of consciousness:  Alert, Oriented x4 and Attentive      Response to Learning: Able to verbalize current knowledge/experience, Able to verbalize/acknowledge new learning, Able to retain information, Capable of insight, Able to change behavior and Progressing to goal      Endings: None Reported    Modes of Intervention: Education, Support, Socialization, Exploration, Clarifying and Problem-solving      Discipline Responsible: /Counselor      Signature:  Franklyn Najjar, LSW

## 2022-05-03 NOTE — PROGRESS NOTES
Department of Psychiatry  Progress Note     Chief Complaint:  Suicidal ideation     PROGRESS:  Diane Jang is seen seated out on the unit. He presents to the interview room. Diane Jang reports he is doing good today. He appears brighter on examination. Mood is \"great. \"  When asked what changed for him, he says he has had more than enough time to think. He realized that he has high expectations for himself and beats himself up when he does not meet those expectations. He says he has been trying to do more positive self talk which has been helping. He is also been going to groups which have been helpful. It has given him an outlet to talk and vent about his situation. He also says his family has been very supportive of him. He rates his mood 8 out of 10 with 10 being the best.  Depression has improved since admission. He denies any active suicidal thoughts during the interview. He is feeling less hopeless and helpless about his situation. He is interested in outpatient therapy following discharge to continue to work on his mood. He reports he has been sleeping great. Staff reported he slept 8 hours broken last night. His appetite has been fine. He has been compliant with his Zoloft and denies any side effects. He has been out in the unit interacting with peers and attending groups. He reports he has been talking on the phone and visiting with his family on the unit.     Suicidal ideations: denies active suicidal ideation  Compliance with medications: good   Medication side effects: absent  ROS: Patient has new complaints:  no  Sleep quality: 8 hours broken last night per staff  Attending groups: yes      OBJECTIVE      Medications  Current Facility-Administered Medications: sertraline (ZOLOFT) tablet 25 mg, 25 mg, Oral, Daily  acetaminophen (TYLENOL) tablet 650 mg, 650 mg, Oral, Q4H PRN  ibuprofen (ADVIL;MOTRIN) tablet 400 mg, 400 mg, Oral, Q6H PRN  magnesium hydroxide (MILK OF MAGNESIA) 400 MG/5ML suspension 30 mL, 30 mL, Oral, Daily PRN  aluminum & magnesium hydroxide-simethicone (MAALOX) 200-200-20 MG/5ML suspension 30 mL, 30 mL, Oral, Q6H PRN  hydrOXYzine (ATARAX) tablet 50 mg, 50 mg, Oral, TID PRN  traZODone (DESYREL) tablet 50 mg, 50 mg, Oral, Nightly PRN  nicotine (NICODERM CQ) 14 MG/24HR 1 patch, 1 patch, TransDERmal, Daily     Physical     height is 6' 3\" (1.905 m) and weight is 180 lb (81.6 kg). His oral temperature is 98.3 °F (36.8 °C). His blood pressure is 108/70 and his pulse is 78. His respiration is 17 and oxygen saturation is 100%.    Lab Results   Component Value Date    WBC 9.1 05/01/2022    HGB 16.1 05/01/2022    HCT 47.5 05/01/2022     05/01/2022    ALT 28 05/01/2022    AST 32 05/01/2022     05/01/2022    K 3.9 05/01/2022     05/01/2022    CREATININE 0.9 05/01/2022    BUN 14 05/01/2022    CO2 23 05/01/2022    TSH 3.000 05/01/2022          Mental Status Exam:   Level of consciousness:  alert and awake  Appearance:  well-appearing, personal attire, in chair, good grooming and good hygiene  Behavior/Motor:  no abnormalities noted  Attitude toward examiner:  cooperative, attentive and good eye contact  Speech:  spontaneous, normal rate and normal volume  Mood:  Great per patient  Affect:  blunted but brightens  Thought processes:  linear, goal directed and coherent  Thought content:  Denies homicidal ideation  Suicidal Ideation:  Denies active suicidal ideation   delusions:  no evidence of delusions  Perceptual Disturbance:  denies any perceptual disturbance  Cognition: Patient is oriented to person, place, time and situation  Concentration: clinically adequate  Memory: intact  Insight & Judgement: fair        ASSESSMENT     Depression with suicidal ideation   Alcohol use disorder, mild   Rule out substance induced mood disorder     PLAN    Patient's symptoms show some improvement today  Medication adjustments as discussed with the attending physician: Will increase Zoloft to 50 mg daily starting tomorrow  Attempt to develop insight, psycho-education and supportive therapy conducted. Probable discharge: To be determined  Follow-up: [facility] outpatient, daily while on inpatient unit    Electronically signed by Neeta Duckworth PA-C on 5/3/2022 at 9:47 AM Reviewed patient's current plan of care and vital signs with nursing staff. **This report has been created using voice recognition software. It may contain minor errors which are inherent in voice recognition technology. **                                              Psychiatry Attending Attestation     I independently saw and evaluated the patient. I reviewed the Advance Practice Provider's documentation above. Any additional comments or changes to the Advance Practice Provider's documentation are stated below otherwise agree with assessment. Patient feels better than before. Mood and affect are better. Patient reports fleeting suicidal thoughts with no intent or plan. Patient notes that these thoughts are occurring less frequently. Denies any homicidal thoughts, that was explored with the patient. Oriented to time place and person. Recent and remote memory is intact. Patient feels hopeful. Sleep and appetite is good. No side effect from medication reported. Side-effect of medication were discussed with the patient . Patient is responding to current treatment. Discharge soon, if patient continues to show improvement. Case discussed with the staff. PLAN  Patient s symptoms are improving  Continue to titrate Zoloft  More than 16 minutes of the session was spent doing supportive psychotherapy. Session lasted over 30 minutes today  Attempt to develop insight  Psycho-education conducted. Supportive Therapy conducted.   Probable discharge is tomorrow  Follow-up TBD    Electronically signed by Daylin Wills MD on 5/3/22 at 4:45 PM EDT

## 2022-05-03 NOTE — PLAN OF CARE
Problem: Self Harm/Suicidality  Goal: Will have no self-injury during hospital stay  Description: INTERVENTIONS:  1. Q 30 MINUTES: Routine safety checks  2. Q SHIFT & PRN: Assess risk to determine if routine checks are adequate to maintain patient safety  5/2/2022 2253 by Gabino Downey LPN  Outcome: Progressing  Note: Pt denies thoughts of self-harm and has not self-harmed so far this shift, pt is encouraged to seek medical staff if thoughts do become present and remains on every 15 minute safety checks. 5/2/2022 1242 by Arabella Dias RN  Outcome: Progressing     Problem: Depression  Goal: Will be euthymic at discharge  Description: INTERVENTIONS:  1. Administer medication as ordered  2. Provide emotional support via 1:1 interaction with staff  3. Encourage involvement in milieu/groups/activities  4. Monitor for social isolation  5/2/2022 2253 by Gabino Downey LPN  Outcome: Progressing  5/2/2022 1242 by Arabella Dias RN  Outcome: Progressing     Problem: Behavior  Goal: Pt/Family maintain appropriate behavior and adhere to behavioral management agreement, if implemented  Description: INTERVENTIONS:  1. Assess patient/family's coping skills and  non-compliant behavior (including use of illegal substances)  2. Notify security of behavior or suspected illegal substances which indicate the need for search of the patient and/or belongings  3. Encourage verbalization of thoughts and concerns in a socially appropriate manner  4. Utilize positive, consistent limit setting strategies supporting safety of patient, staff and others  5. Encourage participation in the decision making process about the behavioral management agreement  6. Implement a Health Care Agreement if patient meets criteria  7. If a patient's behavior jeopardizes the safety of the patient, staff, or others refer to organization policy. If a visitor's behavior poses a threat to safety call refer to organization policy.   8. Initiate consult with , Psychosocial CNS, Spiritual Care as appropriate  5/2/2022 2253 by George Solis LPN  Outcome: Progressing  Flowsheets (Taken 5/2/2022 0800 by Shilpi Tao RN)  Patient/family maintains appropriate behavior and adheres to behavioral management agreement, if implemented: Assess patient/familys coping skills and  non-compliant behavior (including use of illegal substances)  Note: Pt behavior is appropriate this shift   5/2/2022 1242 by Shilpi Tao RN  Outcome: Progressing  Flowsheets (Taken 5/2/2022 0800)  Patient/family maintains appropriate behavior and adheres to behavioral management agreement, if implemented: Assess patient/familys coping skills and  non-compliant behavior (including use of illegal substances)     Problem: Anxiety  Goal: Will report anxiety at manageable levels  Description: INTERVENTIONS:  1. Administer medication as ordered  2. Teach and rehearse alternative coping skills  3. Provide emotional support with 1:1 interaction with staff  5/2/2022 2253 by George Solis LPN  Outcome: Progressing  Note: Pt denies anxiety so far this shift, will monitor for changes. 5/2/2022 1242 by Shilpi Tao RN  Outcome: Progressing  Flowsheets (Taken 5/2/2022 0800)  Will report anxiety at manageable levels: Provide emotional support with 1:1 interaction with staff     Problem: Involuntary Admit  Goal: Will cooperate with staff recommendations and doctor's orders and will demonstrate appropriate behavior  Description: INTERVENTIONS:  1. Treat underlying conditions and offer medication as ordered  2. Educate regarding involuntary admission procedures and rules  3.  Contain excessive/inappropriate behavior per unit and hospital policies  [id number] by George Solis LPN  Outcome: Progressing  Flowsheets (Taken 5/1/2022 1733 by Arley Chavez LPN)  Will cooperate with staff recommendations and doctor's orders and will demonstrate appropriate behavior: Treat underlying conditions and offer medication as ordered  Note: Pt has demonstrated appropriate behavior this shift   5/2/2022 1242 by Keyanna Robison RN  Outcome: Progressing  Flowsheets (Taken 5/1/2022 1733 by Anne Robertson LPN)  Will cooperate with staff recommendations and doctor's orders and will demonstrate appropriate behavior: Treat underlying conditions and offer medication as ordered     Problem: Discharge Planning  Goal: Discharge to home or other facility with appropriate resources  5/2/2022 2253 by Valdez Acuña LPN  Outcome: Progressing  Flowsheets (Taken 5/2/2022 0800 by Keyanna Robison RN)  Discharge to home or other facility with appropriate resources:   Identify barriers to discharge with patient and caregiver   Arrange for needed discharge resources and transportation as appropriate  Note: Discharge planner working with pt to achieve optimal plan of discharge specific to the needs of the pt.   5/2/2022 1242 by Keyanna Robison RN  Outcome: Not Progressing  Flowsheets (Taken 5/2/2022 0800)  Discharge to home or other facility with appropriate resources:   Identify barriers to discharge with patient and caregiver   Arrange for needed discharge resources and transportation as appropriate  Note: Patient not discharged this shift. Patient continues to work with care team toward discharge goal.        Care plan reviewed with patient and denies verbalize understanding of the plan of care and contribute to goal setting.

## 2022-05-03 NOTE — BH NOTE
This RN has reviewed and agrees with Lexii Love LPN's data collection and has collaborated with this LPN regarding the patient's care plan.

## 2022-05-03 NOTE — PLAN OF CARE
Problem: Self Harm/Suicidality  Goal: Will have no self-injury during hospital stay  Description: INTERVENTIONS:  1. Q 30 MINUTES: Routine safety checks  2. Q SHIFT & PRN: Assess risk to determine if routine checks are adequate to maintain patient safety  5/3/2022 1057 by Santhosh Vallecillo RN  Outcome: Progressing  Note: Denies suicidal thoughts or self harming behaviors. Problem: Depression  Goal: Will be euthymic at discharge  Description: INTERVENTIONS:  1. Administer medication as ordered  2. Provide emotional support via 1:1 interaction with staff  3. Encourage involvement in milieu/groups/activities  4. Monitor for social isolation  5/3/2022 1057 by Santhosh Vallecillo RN  Outcome: Progressing  Note: Rates his mood 7.5 hours broken last night. Denies anxiety and depression. Problem: Behavior  Goal: Pt/Family maintain appropriate behavior and adhere to behavioral management agreement, if implemented  Description: INTERVENTIONS:  1. Assess patient/family's coping skills and  non-compliant behavior (including use of illegal substances)  2. Notify security of behavior or suspected illegal substances which indicate the need for search of the patient and/or belongings  3. Encourage verbalization of thoughts and concerns in a socially appropriate manner  4. Utilize positive, consistent limit setting strategies supporting safety of patient, staff and others  5. Encourage participation in the decision making process about the behavioral management agreement  6. Implement a Health Care Agreement if patient meets criteria  7. If a patient's behavior jeopardizes the safety of the patient, staff, or others refer to organization policy. If a visitor's behavior poses a threat to safety call refer to organization policy.   8. Initiate consult with , Psychosocial CNS, Spiritual Care as appropriate  5/3/2022 1057 by Santhosh Vallecillo RN  Outcome: Progressing  Flowsheets (Taken 5/3/2022 [id number])  Patient/family maintains appropriate behavior and adheres to behavioral management agreement, if implemented:   Encourage verbalization of thoughts and concerns in a socially appropriate manner   Encourage participation in the decision making process about the behavioral management agreement  Note: Patient participates in decision making process. Problem: Anxiety  Goal: Will report anxiety at manageable levels  Description: INTERVENTIONS:  1. Administer medication as ordered  2. Teach and rehearse alternative coping skills  3. Provide emotional support with 1:1 interaction with staff  5/3/2022 1057 by Liborio Koehler RN  Outcome: Progressing  Flowsheets (Taken 5/3/2022 1049)  Will report anxiety at manageable levels: Provide emotional support with 1:1 interaction with staff  Note: Denies any anxiety. Problem: Involuntary Admit  Goal: Will cooperate with staff recommendations and doctor's orders and will demonstrate appropriate behavior  Description: INTERVENTIONS:  1. Treat underlying conditions and offer medication as ordered  2. Educate regarding involuntary admission procedures and rules  3. Contain excessive/inappropriate behavior per unit and hospital policies  [id number] by Liborio Koehler RN  Outcome: Progressing  Flowsheets (Taken 5/3/2022 1049)  Will cooperate with staff recommendations and doctor's orders and will demonstrate appropriate behavior: Treat underlying conditions and offer medication as ordered  Note: Patient taking medications as directed. Patient's behaviors are appropriate. Problem: Discharge Planning  Goal: Discharge to home or other facility with appropriate resources  5/3/2022 1057 by Liborio Koehler RN  Outcome: Progressing  Flowsheets (Taken 5/3/2022 1049)  Discharge to home or other facility with appropriate resources: Identify barriers to discharge with patient and caregiver  Note: Discharge planning in process. Patient does have a PCP.

## 2022-05-04 VITALS
SYSTOLIC BLOOD PRESSURE: 127 MMHG | BODY MASS INDEX: 22.38 KG/M2 | WEIGHT: 180 LBS | TEMPERATURE: 98.2 F | OXYGEN SATURATION: 100 % | HEIGHT: 75 IN | RESPIRATION RATE: 16 BRPM | DIASTOLIC BLOOD PRESSURE: 61 MMHG | HEART RATE: 90 BPM

## 2022-05-04 PROCEDURE — 99238 HOSP IP/OBS DSCHRG MGMT 30/<: CPT | Performed by: PSYCHIATRY & NEUROLOGY

## 2022-05-04 PROCEDURE — 6370000000 HC RX 637 (ALT 250 FOR IP): Performed by: PHYSICIAN ASSISTANT

## 2022-05-04 PROCEDURE — 5130000000 HC BRIDGE APPOINTMENT

## 2022-05-04 RX ADMIN — SERTRALINE 50 MG: 50 TABLET, FILM COATED ORAL at 08:24

## 2022-05-04 ASSESSMENT — PAIN SCALES - GENERAL
PAINLEVEL_OUTOF10: 0
PAINLEVEL_OUTOF10: 0

## 2022-05-04 NOTE — DISCHARGE SUMMARY
Provider Discharge Summary     Patient ID:  Merna Dueñas  [id number]  70 y.o.  2000    Admit date: 5/1/2022    Discharge date and time: 5/4/2022  4:12 PM     Admitting Physician: Lindsay Power MD     Discharge Physician: Lindsay Power MD    Admission Diagnoses: MDD (major depressive disorder), recurrent episode ([facility] [address] [F33.9]  Depression with suicidal ideation [F32. A, T52.504]  Acute alcoholic intoxication without complication ([facility] [address] [F81.885]  Suicidal behavior without attempted self-injury [R45.89]    Discharge Diagnoses:      Depression with suicidal ideation     Patient Active Problem List   Diagnosis Code    Depression with suicidal ideation F32. A, R45.851        Admission Condition: poor    Discharged Condition: stable    Indication for Admission: threat to self    History of Present Illnes (present tense wording is of findings from admission exam and are not necessarily indicative of current findings):   Merna Dueñas is a 25 y.o. male with a history of depression  who presented to the emergency department on [facility] by [address] [place] [employer] for suicidal ideation.      Per Bryson Jai made multiple threats to kill himself and tried fighting officers. He also made threats for me to \"kill\" him.      Per the [facility] note: \"Pt states he \"wants to end it all and be done. \" He started having these thoughts tonight but cannot remember why. \"Sushila had a lot to drink. \" He reports feeling like \"the sum of the Earth and there's no point in being around like that. \" Pt is unaware who called the police and does not recall which friend he made suicidal statements to. Pt states he attempted suicide in May 2019 \"I loaded a shell in the shogun and just looked at it. I should have done it. \" Reports recent stressors of college and family. \"      BAL was 0.23 upon arrival to the ED      eYny Farooq reports he was drinking with his friends yesterday.   He states he was telling his friends that he was suicidal and then his friends called the . He confirms that he did ask the  to kill him. He reports he has been having suicidal thoughts coming and going for the last few years. He says lately, the thoughts been getting progressively worse. He denies having a specific plan or intent prior to yesterday. He reports he has been dealing with depression for a few years since his grandfather  in 2019. He says he then later dropped out of college. He has never gotten formal help for his depression. He states he always thought he could deal with it on his own. He mentions that he feels like a burden on his family and at work. He says at this time, he does not have enough money saved up to live on his own so he has to live with his parents. He feels like a burden at work because every mistake he makes he feels a whole weight come down on him. He endorses having significant issues with self-esteem since a young age. He mentions that he puts everyone before himself. He denies feeling down and sad for more days than not. He reports his sleep has been fine at home. Appetite has been good. Energy and motivation have been good. He enjoys working every day. Denies anhedonia. He reports he enjoys playing the [employer] and video games. He has been feeling worthless, hopeless and helpless.   Trevor Holt continues to feel depressed today. He reports he is ashamed about the events that led to his admission and that it had to happen that way. He endorses fleeting suicidal thoughts but denies any specific plan or intent. He denies any active suicidal thoughts during the interview and is able to contract for safety in the unit. He denies any hallucinations. No evidence of delusions or overt psychosis on examination. Hospital Course:   Upon admission, Jorge L Courtney was provided a safe secure environment, introduced to unit milieu. Patient participated in groups and individual therapies. Meds were adjusted as noted below. After few days of hospital care, patient began to feel improvement. Depression lifted, thoughts to harm self ceased. Sleep improved, appetite was good. On morning rounds 5/4/2022, Israel Alvarez endorses feeling ready for discharge. Patient denies suicidal or homicidal ideations, denies hallucinations or delusions. Denies SE's from meds. It was decided that maximum benefit from hospital care had been achieved and patient can be discharged. Consults:   None    Significant Diagnostic Studies: Routine labs and diagnostics    Treatments: Psychotropic medications, therapy with group, milieu, and 1:1 with nurses, social workers, PA-C/CNP, and Attending physician. Discharge Medications:  Discharge Medication List as of 5/4/2022 11:17 AM      START taking these medications    Details   sertraline (ZOLOFT) 50 MG tablet Take 1 tablet by mouth daily, Disp-30 tablet, R-0Normal              Core Measures statement:   Not applicable    Discharge Exam:  Level of consciousness:  Within normal limits  Appearance: Street clothes, seated, with good grooming  Behavior/Motor: No abnormalities noted  Attitude toward examiner:  Cooperative, attentive, good eye contact  Speech:  spontaneous, normal rate, normal volume and well articulated  Mood:  euthymic  Affect:  Full range  Thought processes:  linear, goal directed and coherent  Thought content:  denies homicidal ideation  Suicidal Ideation:  denies suicidal ideation  Delusions:  no evidence of delusions  Perceptual Disturbance:  denies any perceptual disturbance  Cognition:  Intact  Memory: age appropriate  Insight & Judgement: fair  Medication side effects: denies     Disposition: home    Patient Instructions: Activity: activity as tolerated  1. Patient instructed to take medications regularly and follow up with outpatient appointments.      Follow-up as scheduled with HC       Signed:    Electronically signed by Reshma Casper MD on 5/4/22 at 4:12 PM EDT    Time Spent on discharge is more than 10 minutes in the examination, evaluation, counseling and review of medications and discharge plan. Patient is evaluated by Rowena HENSLEY on the unit in person and I evaluated patient as Tele visit. Denis Rosado is a 25 y.o. male being evaluated by a Virtual Visit (video visit) encounter to address concerns as mentioned above. A caregiver was present in the room along with the patient. Patient is present at [address] [facility], [facility] and I am physically present at [place], [place]    --Mannie Resendiz MD on 5/4/2022 at [address] was used to authenticate this note. **This report has been created using voice recognition software. It may contain minor errors which are inherent in voice recognition technology. **

## 2022-05-04 NOTE — DISCHARGE INSTR - DIET

## 2022-05-04 NOTE — PLAN OF CARE
Problem: Self Harm/Suicidality  Goal: Will have no self-injury during hospital stay  Description: INTERVENTIONS:  1. Q 30 MINUTES: Routine safety checks  2. Q SHIFT & PRN: Assess risk to determine if routine checks are adequate to maintain patient safety  5/3/2022 2058 by Linwood Grady LPN  Outcome: Progressing  Note: Pt has had self-injury so far this shift and denies thoughts of self-harm. Pt is encouraged to seek medical staff if thoughts do become present and remains on every 15 minute safety checks. 5/3/2022 1057 by Raina Koroma RN  Outcome: Progressing  Note: Denies suicidal thoughts or self harming behaviors. Problem: Depression  Goal: Will be euthymic at discharge  Description: INTERVENTIONS:  1. Administer medication as ordered  2. Provide emotional support via 1:1 interaction with staff  3. Encourage involvement in milieu/groups/activities  4. Monitor for social isolation  5/3/2022 2058 by Linwood Grady LPN  Outcome: Progressing  Note: Pt rates mood an 8/10 with 10 being the best, has a bright affect and identifies hope for the future and denies anxiety and depression   5/3/2022 1057 by Raina Koroma RN  Outcome: Progressing  Note: Rates his mood 7.5 hours broken last night. Denies anxiety and depression. Problem: Behavior  Goal: Pt/Family maintain appropriate behavior and adhere to behavioral management agreement, if implemented  Description: INTERVENTIONS:  1. Assess patient/family's coping skills and  non-compliant behavior (including use of illegal substances)  2. Notify security of behavior or suspected illegal substances which indicate the need for search of the patient and/or belongings  3. Encourage verbalization of thoughts and concerns in a socially appropriate manner  4. Utilize positive, consistent limit setting strategies supporting safety of patient, staff and others  5.  Encourage participation in the decision making process about the behavioral management agreement  6. Implement a Health Care Agreement if patient meets criteria  7. If a patient's behavior jeopardizes the safety of the patient, staff, or others refer to organization policy. If a visitor's behavior poses a threat to safety call refer to organization policy. 8. Initiate consult with , Psychosocial CNS, Spiritual Care as appropriate  5/3/2022 2058 by Gabino Downey LPN  Outcome: Progressing  [address] (Taken 5/3/2022 1049 by Kathie Sagastume RN)  Patient/family maintains appropriate behavior and adheres to behavioral management agreement, if implemented:   Encourage verbalization of thoughts and concerns in a socially appropriate manner   Encourage participation in the decision making process about the behavioral management agreement  Note: Pt participates in care planning and decision making   5/3/2022 1057 by Kathie Sagastume RN  Outcome: Progressing  Flowsheets  Taken 5/3/2022 1049 by Kathie Sagastume RN  Patient/family maintains appropriate behavior and adheres to behavioral management agreement, if implemented:   Encourage verbalization of thoughts and concerns in a socially appropriate manner   Encourage participation in the decision making process about the behavioral management agreement  Taken 5/2/2022 2300 by Gabino Downey LPN  Patient/family maintains appropriate behavior and adheres to behavioral management agreement, if implemented: Assess patient/familys coping skills and  non-compliant behavior (including use of illegal substances)  Note: Patient participates in decision making process. Problem: Anxiety  Goal: Will report anxiety at manageable levels  Description: INTERVENTIONS:  1. Administer medication as ordered  2. Teach and rehearse alternative coping skills  3.  Provide emotional support with 1:1 interaction with staff  5/3/2022 2058 by Gabino Downey LPN  Outcome: Progressing  Flowsheets (Taken 5/3/2022 1049 by Kathie Sagastume RN)  Will report anxiety at manageable levels: Provide emotional support with 1:1 interaction with staff  Note: Denies anxiety   5/3/2022 1057 by Mabel Cornelius RN  Outcome: Progressing  Flowsheets  Taken 5/3/2022 1049 by Mabel Cornelius RN  Will report anxiety at manageable levels: Provide emotional support with 1:1 interaction with staff  Taken 5/2/2022 2300 by Nannette Valdez LPN  Will report anxiety at manageable levels:   Provide emotional support with 1:1 interaction with staff   Teach and rehearse alternative coping skills  Note: Denies any anxiety. Problem: Involuntary Admit  Goal: Will cooperate with staff recommendations and doctor's orders and will demonstrate appropriate behavior  Description: INTERVENTIONS:  1. Treat underlying conditions and offer medication as ordered  2. Educate regarding involuntary admission procedures and rules  3. Contain excessive/inappropriate behavior per unit and hospital policies  3/0/5912 [id number] by Nannette Valdez LPN  Outcome: Progressing  Flowsheets (Taken 5/3/2022 1049 by Mabel Cornelius RN)  Will cooperate with staff recommendations and doctor's orders and will demonstrate appropriate behavior: Treat underlying conditions and offer medication as ordered  Note: Pt taking medications as directed. Pt behaviors are appropriate   5/3/2022 1057 by Mabel Cornelius RN  Outcome: Progressing  Flowsheets  Taken 5/3/2022 1049 by Mabel Cornelius RN  Will cooperate with staff recommendations and doctor's orders and will demonstrate appropriate behavior: Treat underlying conditions and offer medication as ordered  Taken 5/2/2022 2300 by Nannette Valdez LPN  Will cooperate with staff recommendations and doctor's orders and will demonstrate appropriate behavior: Treat underlying conditions and offer medication as ordered  Note: Patient taking medications as directed. Patient's behaviors are appropriate.       Problem: Discharge Planning  Goal: Discharge to home or other facility with appropriate resources  5/3/2022 2058 by Eddi Sweeney LPN  Outcome: Progressing  Flowsheets (Taken 5/3/2022 1049 by Torrey Harry RN)  Discharge to home or other facility with appropriate resources: Identify barriers to discharge with patient and caregiver  Note: Discharge planner working with pt to achieve optimal plan of discharge specific to the needs of the pt. Pt will be discharged back home and may follow up with [facility]   5/3/2022 1057 by Torrey Harry RN  Outcome: Progressing  Flowsheets  Taken 5/3/2022 1049 by Torrey Harry RN  Discharge to home or other facility with appropriate resources: Identify barriers to discharge with patient and caregiver  Taken 5/2/2022 2300 by Eddi Sweeney LPN  Discharge to home or other facility with appropriate resources:   Identify barriers to discharge with patient and caregiver   Arrange for needed discharge resources and transportation as appropriate  Note: Discharge planning in process. Patient does have a PCP. Care plan reviewed with patient and does verbalize understanding of the plan of care and contribute to goal setting.

## 2022-05-04 NOTE — PROGRESS NOTES
This RN has reviewed and agrees with João Lamar LPN's data collection and has collaborated with this LPN regarding the patient's discharge plan.

## 2022-05-04 NOTE — PROGRESS NOTES
Discharge planning-Sven is to go to [facility] for a wlk in assessment. Walk in times are on Monday@ 2:30, Wednesday at 0930 and 3:30, Friday @1230.

## 2022-05-04 NOTE — PROGRESS NOTES
Behavioral Health   Discharge Note    Pt discharged with followings belongings:   Dental Appliances: None  Vision - Corrective Lenses: Eyeglasses  Hearing Aid: None  Jewelry: None  Body Piercings Removed: No  Clothing: Pants,Shirt,Socks,Undergarments (belt)  Other Valuables:  (Lucio Martínez is no longer here Mother took it home)   Valuables retrieved from safe, security envelope number and returned to patient. Patient left department with Parents via personal car. Discharged to Home. \"An Important Message from Medicare About Your Rights\" (IMM) form photocopy original from admission and provided to pt at least 4 hours prior to discharge yes. If pt left within 4 hours of receiving 2nd delivery of IMM, this is because pt was agreeable with hospital discharge. Patient/guardian education on aftercare instructions: Yes  Bridge appointment completed:  yes. Reviewed Discharge Instructions with patient/family/nursing facility. Patient/family verbalizes understanding and agreement with the discharge plan using the teachback method. Information given by Casper Kan Patient/family verbalize understanding of AVS:Yes    Status EXAM upon discharge:  Mental Status and Behavioral Exam  Normal: Yes  Level of Assistance: Independent/Self  Facial Expression: Brightened  Affect: Appropriate  Level of Consciousness: Alert  Frequency of Checks: 4 times per hour, close  Mood:Normal: Yes  Mood: Other (comment) (normal)  Motor Activity:Normal: Yes  Motor Activity: Decreased  Eye Contact: Good  Observed Behavior: Friendly,Cooperative  Sexual Misconduct History: Current - no  Preception: [place] to person,[place] to time,[place] to place,[place] to situation  Attention:Normal: Yes  Thought Processes: Circumstantial  Thought Content:Normal: Yes  Depression Symptoms: No problems reported or observed. Anxiety Symptoms: No problems reported or observed. Jenny Symptoms: No problems reported or observed.   Hallucinations: None  Delusions: No  Memory:Normal: Yes  Memory: Poor recent  Insight and Judgment: No  Insight and Judgment: Poor insight    Norberto Nagel LPN     Patient has been discharged to his parents on 5-4-2022 at 0 pm. Educated on discharge instructions.

## 2022-05-05 ENCOUNTER — TELEPHONE (OUTPATIENT)
Dept: PSYCHIATRY | Age: 22
End: 2022-05-05